# Patient Record
Sex: MALE | Race: WHITE | NOT HISPANIC OR LATINO | Employment: FULL TIME | URBAN - METROPOLITAN AREA
[De-identification: names, ages, dates, MRNs, and addresses within clinical notes are randomized per-mention and may not be internally consistent; named-entity substitution may affect disease eponyms.]

---

## 2017-03-25 ENCOUNTER — HOSPITAL ENCOUNTER (EMERGENCY)
Facility: HOSPITAL | Age: 27
Discharge: HOME | End: 2017-03-25
Attending: EMERGENCY MEDICINE

## 2017-03-25 VITALS
SYSTOLIC BLOOD PRESSURE: 152 MMHG | WEIGHT: 220 LBS | BODY MASS INDEX: 31.5 KG/M2 | RESPIRATION RATE: 20 BRPM | DIASTOLIC BLOOD PRESSURE: 90 MMHG | TEMPERATURE: 98 F | HEIGHT: 70 IN | HEART RATE: 104 BPM | OXYGEN SATURATION: 95 %

## 2017-03-25 DIAGNOSIS — J45.21 MILD INTERMITTENT ASTHMA WITH ACUTE EXACERBATION: Primary | ICD-10-CM

## 2017-03-25 DIAGNOSIS — R06.02 SOB (SHORTNESS OF BREATH): ICD-10-CM

## 2017-03-25 LAB
ALBUMIN SERPL BCP-MCNC: 4.2 G/DL
ALLENS TEST: ABNORMAL
ALP SERPL-CCNC: 43 U/L
ALT SERPL W/O P-5'-P-CCNC: 24 U/L
ANION GAP SERPL CALC-SCNC: 14 MMOL/L
AST SERPL-CCNC: 25 U/L
BACTERIA #/AREA URNS HPF: NORMAL /HPF
BASOPHILS # BLD AUTO: 0.02 K/UL
BASOPHILS NFR BLD: 0.2 %
BILIRUB SERPL-MCNC: 2.3 MG/DL
BILIRUB UR QL STRIP: ABNORMAL
BUN SERPL-MCNC: 12 MG/DL
CALCIUM SERPL-MCNC: 9.5 MG/DL
CHLORIDE SERPL-SCNC: 102 MMOL/L
CLARITY UR: CLEAR
CO2 SERPL-SCNC: 21 MMOL/L
COLOR UR: YELLOW
CREAT SERPL-MCNC: 0.9 MG/DL
DELSYS: ABNORMAL
DIFFERENTIAL METHOD: ABNORMAL
EOSINOPHIL # BLD AUTO: 0.3 K/UL
EOSINOPHIL NFR BLD: 2.8 %
ERYTHROCYTE [DISTWIDTH] IN BLOOD BY AUTOMATED COUNT: 13.4 %
EST. GFR  (AFRICAN AMERICAN): >60 ML/MIN/1.73 M^2
EST. GFR  (NON AFRICAN AMERICAN): >60 ML/MIN/1.73 M^2
FIO2: 21
GLUCOSE SERPL-MCNC: 132 MG/DL
GLUCOSE UR QL STRIP: NEGATIVE
HCO3 UR-SCNC: 21.4 MMOL/L (ref 24–28)
HCT VFR BLD AUTO: 48.7 %
HGB BLD-MCNC: 17.8 G/DL
HGB UR QL STRIP: ABNORMAL
HYALINE CASTS #/AREA URNS LPF: 0 /LPF
KETONES UR QL STRIP: ABNORMAL
LEUKOCYTE ESTERASE UR QL STRIP: NEGATIVE
LYMPHOCYTES # BLD AUTO: 1.7 K/UL
LYMPHOCYTES NFR BLD: 16.9 %
MCH RBC QN AUTO: 30.3 PG
MCHC RBC AUTO-ENTMCNC: 36.6 %
MCV RBC AUTO: 83 FL
MICROSCOPIC COMMENT: NORMAL
MODE: ABNORMAL
MONOCYTES # BLD AUTO: 1.9 K/UL
MONOCYTES NFR BLD: 18.6 %
NEUTROPHILS # BLD AUTO: 6.1 K/UL
NEUTROPHILS NFR BLD: 61.9 %
NITRITE UR QL STRIP: NEGATIVE
PCO2 BLDA: 23.6 MMHG (ref 35–45)
PH SMN: 7.57 [PH] (ref 7.35–7.45)
PH UR STRIP: 6 [PH] (ref 5–8)
PLATELET # BLD AUTO: 191 K/UL
PLATELET BLD QL SMEAR: ABNORMAL
PMV BLD AUTO: 10.9 FL
PO2 BLDA: 63 MMHG (ref 80–100)
POC BE: -1 MMOL/L
POC SATURATED O2: 95 % (ref 95–100)
POTASSIUM SERPL-SCNC: 3.2 MMOL/L
PROT SERPL-MCNC: 8 G/DL
PROT UR QL STRIP: ABNORMAL
RBC # BLD AUTO: 5.88 M/UL
RBC #/AREA URNS HPF: 1 /HPF (ref 0–4)
SAMPLE: ABNORMAL
SITE: ABNORMAL
SODIUM SERPL-SCNC: 137 MMOL/L
SP GR UR STRIP: >=1.03 (ref 1–1.03)
SQUAMOUS #/AREA URNS HPF: 0 /HPF
STOMATOCYTES BLD QL SMEAR: PRESENT
URN SPEC COLLECT METH UR: ABNORMAL
UROBILINOGEN UR STRIP-ACNC: 1 EU/DL
WBC # BLD AUTO: 9.94 K/UL
WBC #/AREA URNS HPF: 0 /HPF (ref 0–5)

## 2017-03-25 PROCEDURE — 85025 COMPLETE CBC W/AUTO DIFF WBC: CPT

## 2017-03-25 PROCEDURE — 63600175 PHARM REV CODE 636 W HCPCS: Performed by: EMERGENCY MEDICINE

## 2017-03-25 PROCEDURE — 81000 URINALYSIS NONAUTO W/SCOPE: CPT

## 2017-03-25 PROCEDURE — 93005 ELECTROCARDIOGRAM TRACING: CPT

## 2017-03-25 PROCEDURE — 93010 ELECTROCARDIOGRAM REPORT: CPT | Mod: 76,,, | Performed by: INTERNAL MEDICINE

## 2017-03-25 PROCEDURE — 94640 AIRWAY INHALATION TREATMENT: CPT

## 2017-03-25 PROCEDURE — 27000221 HC OXYGEN, UP TO 24 HOURS

## 2017-03-25 PROCEDURE — 99900035 HC TECH TIME PER 15 MIN (STAT)

## 2017-03-25 PROCEDURE — 99285 EMERGENCY DEPT VISIT HI MDM: CPT | Mod: 25

## 2017-03-25 PROCEDURE — 80053 COMPREHEN METABOLIC PANEL: CPT

## 2017-03-25 PROCEDURE — 96374 THER/PROPH/DIAG INJ IV PUSH: CPT

## 2017-03-25 PROCEDURE — 82803 BLOOD GASES ANY COMBINATION: CPT

## 2017-03-25 PROCEDURE — 25000242 PHARM REV CODE 250 ALT 637 W/ HCPCS: Performed by: EMERGENCY MEDICINE

## 2017-03-25 PROCEDURE — 36600 WITHDRAWAL OF ARTERIAL BLOOD: CPT

## 2017-03-25 RX ORDER — PREDNISONE 50 MG/1
50 TABLET ORAL DAILY
Qty: 5 TABLET | Refills: 0 | Status: SHIPPED | OUTPATIENT
Start: 2017-03-25 | End: 2017-03-30

## 2017-03-25 RX ORDER — FLUTICASONE PROPIONATE AND SALMETEROL 100; 50 UG/1; UG/1
1 POWDER RESPIRATORY (INHALATION) 2 TIMES DAILY
COMMUNITY

## 2017-03-25 RX ORDER — LORAZEPAM 2 MG/ML
1 INJECTION INTRAMUSCULAR
Status: COMPLETED | OUTPATIENT
Start: 2017-03-25 | End: 2017-03-25

## 2017-03-25 RX ORDER — ALBUTEROL SULFATE 0.83 MG/ML
2.5 SOLUTION RESPIRATORY (INHALATION) EVERY 6 HOURS PRN
Qty: 30 EACH | Refills: 0 | Status: SHIPPED | OUTPATIENT
Start: 2017-03-25 | End: 2018-03-25

## 2017-03-25 RX ORDER — IPRATROPIUM BROMIDE AND ALBUTEROL SULFATE 2.5; .5 MG/3ML; MG/3ML
3 SOLUTION RESPIRATORY (INHALATION)
Status: COMPLETED | OUTPATIENT
Start: 2017-03-25 | End: 2017-03-25

## 2017-03-25 RX ADMIN — IPRATROPIUM BROMIDE AND ALBUTEROL SULFATE 3 ML: .5; 3 SOLUTION RESPIRATORY (INHALATION) at 09:03

## 2017-03-25 RX ADMIN — LORAZEPAM 1 MG: 2 INJECTION, SOLUTION INTRAMUSCULAR; INTRAVENOUS at 10:03

## 2017-03-25 NOTE — ED NOTES
Patient sitting up in bed, no acute distress noted, awake, alert, and oriented x 3, calm, respirations even, and skin is warm and dry. Patient verbalized understanding of status and plan of care. Patient denies needs at this time. Side rails up x 2, call light in reach, bed low and locked. Will continue to monitor.

## 2017-03-25 NOTE — ED PROVIDER NOTES
SCRIBE #1 NOTE: I, Nabil Wyman, am scribing for, and in the presence of, Carrillo Corrigan MD. I have scribed the HPI, ROS, and PE.     SCRIBE #2 NOTE: I, Presley Hollis, am scribing for, and in the presence of,  Carrillo Corrigan MD. I have scribed the remaining portions of the note not scribed by Scribe #1.     History      Chief Complaint   Patient presents with    Asthma     asthma flare up, c/o SOB with wheezing       Review of patient's allergies indicates:   Allergen Reactions    Adhesive         HPI   HPI    3/25/2017, 9:42 AM   History obtained from the patient      History of Present Illness: Otf Salinas is a 27 y.o. male patient who presents to the Emergency Department for SOB which onset gradually 2 days ago worsening since. Symptoms are constant and moderate in severity. Sx are exacerbated by nothing and relieved by nothing. Pt describes his sxs as an asthma attack. Associated sxs include wheezing and congestion. No other sxs reported. Pt received solumedrol en route to ED via [facility]. Patient denies any fever, N/V/D, chills, abd pain, palpitations, chest tightness, CP, cough, leg swelling and all other sxs at this time. Pt reports having to be admitted in he past for an asthma exacerbation. No further complaints or concerns at this time.     Arrival mode: [facility]    PCP: Christel Pavon MD       Past Medical History:  Past Medical History:   Diagnosis Date    Asthma        Past Surgical History:  Past Surgical History:   Procedure Laterality Date    KNEE ARTHROSCOPY      TONSILLECTOMY      TONSILLECTOMY, ADENOIDECTOMY           Family History:  History reviewed. No pertinent family history.    Social History:  Social History     Social History Main Topics    Smoking status: Current Some Day Smoker     Types: Cigarettes    Smokeless tobacco: Not on file    Alcohol use Yes      Comment: occassionally    Drug use: Yes     Special: Marijuana      Comment: rarely    Sexual activity: Not on  file       ROS   Review of Systems   Constitutional: Negative for chills and fever.   HENT: Positive for congestion. Negative for sore throat.    Respiratory: Positive for shortness of breath and wheezing. Negative for chest tightness.    Cardiovascular: Negative for chest pain.   Gastrointestinal: Negative for abdominal pain, nausea and vomiting.   Musculoskeletal: Negative for back pain and neck pain.   Skin: Negative for rash.   Neurological: Negative for dizziness, numbness and headaches.   Psychiatric/Behavioral: Negative for agitation and confusion.   All other systems reviewed and are negative.      Physical Exam    Initial Vitals   BP Pulse Resp Temp SpO2   03/25/17 0940 03/25/17 0940 03/25/17 0940 03/25/17 0940 03/25/17 0940   149/96 110 24 98.4 °F (36.9 °C) 99 %      Physical Exam  Nursing Notes and Vital Signs Reviewed.  Constitutional: Patient is in mild distress. Awake and alert. Well-developed and well-nourished.  Head: Atraumatic. Normocephalic.  Eyes: PERRL. EOM intact. Conjunctivae are not pale. No scleral icterus.  ENT: Mucous membranes are moist. Oropharynx is clear and symmetric.    Neck: Supple. Full ROM. No lymphadenopathy.  Cardiovascular: Tachycardic, normal heart sounds. No murmurs, rubs, or gallops. Distal pulses are 2+ and symmetric.  Pulmonary/Chest: Mild respiratory distress. Diffuse wheezing noted bilaterally. No wheezing, rales, or rhonchi.  Abdominal: Soft and non-distended.  There is no tenderness.  No rebound, guarding, or rigidity. Good bowel sounds.  Musculoskeletal: Moves all extremities. No obvious deformities. No edema. No calf tenderness.  Skin: Warm and dry.  Neurological:  Alert, awake, and appropriate.  Normal speech.  No acute focal neurological deficits are appreciated.  Psychiatric: Normal affect. Good eye contact. Appropriate in content.    ED Course    Procedures  ED Vital Signs:  Vitals:    03/25/17 0940 03/25/17 0946 03/25/17 0950 03/25/17 0952   BP: (!) 149/96     "  Pulse: 110 (!) 112 106 (!) 112   Resp: (!) 24 (!) 22 (!) 22    Temp: 98.4 °F (36.9 °C)      TempSrc: Oral      SpO2: 99% 97% 98%    Weight: 99.8 kg (220 lb)      Height: 5' 10" (1.778 m)       03/25/17 0955 03/25/17 1036 03/25/17 1048 03/25/17 1052   BP:       Pulse: 105 (S) (!) 127 (!) 118 (!) 119   Resp: 18 (!) 30 (!) 26 (!) 21   Temp:       TempSrc:       SpO2: 98% 95% (!) 91% 97%   Weight:       Height:        03/25/17 1131   BP: (!) 142/84   Pulse: (!) 115   Resp: (!) 22   Temp:    TempSrc:    SpO2: (!) 94%   Weight:    Height:        Abnormal Lab Results:  Labs Reviewed   CBC W/ AUTO DIFFERENTIAL - Abnormal; Notable for the following:        Result Value    MCHC 36.6 (*)     Mono # 1.9 (*)     Lymph% 16.9 (*)     Mono% 18.6 (*)     All other components within normal limits   COMPREHENSIVE METABOLIC PANEL - Abnormal; Notable for the following:     Potassium 3.2 (*)     CO2 21 (*)     Glucose 132 (*)     Total Bilirubin 2.3 (*)     Alkaline Phosphatase 43 (*)     All other components within normal limits   URINALYSIS - Abnormal; Notable for the following:     Specific Gravity, UA >=1.030 (*)     Protein, UA 1+ (*)     Ketones, UA 1+ (*)     Bilirubin (UA) 1+ (*)     Occult Blood UA 2+ (*)     All other components within normal limits   ISTAT PROCEDURE - Abnormal; Notable for the following:     POC PH 7.566 (*)     POC PCO2 23.6 (*)     POC PO2 63 (*)     POC HCO3 21.4 (*)     All other components within normal limits   URINALYSIS MICROSCOPIC        All Lab Results:  Results for orders placed or performed during the hospital encounter of 03/25/17   CBC auto differential   Result Value Ref Range    WBC 9.94 3.90 - 12.70 K/uL    RBC 5.88 4.60 - 6.20 M/uL    Hemoglobin 17.8 14.0 - 18.0 g/dL    Hematocrit 48.7 40.0 - 54.0 %    MCV 83 82 - 98 fL    MCH 30.3 27.0 - 31.0 pg    MCHC 36.6 (H) 32.0 - 36.0 %    RDW 13.4 11.5 - 14.5 %    Platelets 191 150 - 350 K/uL    MPV 10.9 9.2 - 12.9 fL    Gran # 6.1 1.8 - 7.7 K/uL    " Lymph # 1.7 1.0 - 4.8 K/uL    Mono # 1.9 (H) 0.3 - 1.0 K/uL    Eos # 0.3 0.0 - 0.5 K/uL    Baso # 0.02 0.00 - 0.20 K/uL    Gran% 61.9 38.0 - 73.0 %    Lymph% 16.9 (L) 18.0 - 48.0 %    Mono% 18.6 (H) 4.0 - 15.0 %    Eosinophil% 2.8 0.0 - 8.0 %    Basophil% 0.2 0.0 - 1.9 %    Platelet Estimate Appears normal     Stomatocytes Present     Differential Method Automated    Comprehensive metabolic panel   Result Value Ref Range    Sodium 137 136 - 145 mmol/L    Potassium 3.2 (L) 3.5 - 5.1 mmol/L    Chloride 102 95 - 110 mmol/L    CO2 21 (L) 23 - 29 mmol/L    Glucose 132 (H) 70 - 110 mg/dL    BUN, Bld 12 6 - 20 mg/dL    Creatinine 0.9 0.5 - 1.4 mg/dL    Calcium 9.5 8.7 - 10.5 mg/dL    Total Protein 8.0 6.0 - 8.4 g/dL    Albumin 4.2 3.5 - 5.2 g/dL    Total Bilirubin 2.3 (H) 0.1 - 1.0 mg/dL    Alkaline Phosphatase 43 (L) 55 - 135 U/L    AST 25 10 - 40 U/L    ALT 24 10 - 44 U/L    Anion Gap 14 8 - 16 mmol/L    eGFR if African American >60 >60 mL/min/1.73 m^2    eGFR if non African American >60 >60 mL/min/1.73 m^2   Urinalysis   Result Value Ref Range    Specimen UA Urine, Clean Catch     Color, UA Yellow Yellow, Straw, Opal    Appearance, UA Clear Clear    pH, UA 6.0 5.0 - 8.0    Specific Gravity, UA >=1.030 (A) 1.005 - 1.030    Protein, UA 1+ (A) Negative    Glucose, UA Negative Negative    Ketones, UA 1+ (A) Negative    Bilirubin (UA) 1+ (A) Negative    Occult Blood UA 2+ (A) Negative    Nitrite, UA Negative Negative    Urobilinogen, UA 1.0 <2.0 EU/dL    Leukocytes, UA Negative Negative   Urinalysis Microscopic   Result Value Ref Range    RBC, UA 1 0 - 4 /hpf    WBC, UA 0 0 - 5 /hpf    Bacteria, UA Rare None-Occ /hpf    Squam Epithel, UA 0 /hpf    Hyaline Casts, UA 0 0-1/lpf /lpf    Microscopic Comment SEE COMMENT    ISTAT PROCEDURE   Result Value Ref Range    POC PH 7.566 (HH) 7.35 - 7.45    POC PCO2 23.6 (LL) 35 - 45 mmHg    POC PO2 63 (L) 80 - 100 mmHg    POC HCO3 21.4 (L) 24 - 28 mmol/L    POC BE -1 -2 to 2 mmol/L     POC SATURATED O2 95 95 - 100 %    Sample ARTERIAL     Site LR     Allens Test Pass     DelSys Room Air     Mode SPONT     FiO2 21          Imaging Results:  Imaging Results         X-Ray Chest AP Portable (Final result) Result time:  03/25/17 10:12:42    Final result by Kimberli Monaco MD (03/25/17 10:12:42)    Impression:         Negative portable chest x-ray.      Electronically signed by: KIMBERLI MONACO MD  Date:     03/25/17  Time:    10:12     Narrative:    Portable chest x-ray. 03/25/17 09:52:58    Clinical indication:  sob    Comparison is made with the previous study of 12/31/2015.  Heart size is normal. The lung fields are clear. No acute cardiopulmonary infiltrate.               The EKG was ordered, reviewed, and independently interpreted by the ED provider.  Interpretation time: 0947  Rate: 102 BPM  Rhythm: sinus tachycardia  Interpretation: T wave abnormality. No STEMI.         The EKG was ordered, reviewed, and independently interpreted by the ED provider.  Interpretation time: 1015  Rate: 123 BPM  Rhythm: sinus tachycardia  Interpretation: Right atrial enlargement. Rightward axis. Pulmonary disease pattern. No STEMI.    The Emergency Provider reviewed the vital signs and test results, which are outlined above.    ED Discussion      11:28 AM: Reassessed pt at this time. Pt is awake, alert, and in NAD. Pt states his condition has improved at this time. Discussed with pt all pertinent ED information and results. Discussed pt dx of mild intermittent asthma with acute exacerbation and plan of tx. Gave pt all f/u and return to the ED instructions. All questions and concerns were addressed at this time. Pt expresses understanding of information and instructions, and is comfortable with plan to discharge. Pt is stable for discharge.    I discussed with patient and/or family/caretaker that evaluation in the ED does not suggest any emergent or life threatening medical conditions requiring immediate  intervention beyond what was provided in the ED, and I believe patient is safe for discharge.  Regardless, an unremarkable evaluation in the ED does not preclude the development or presence of a serious of life threatening condition. As such, patient was instructed to return immediately for any worsening or change in current symptoms.    Pre-hypertension/Hypertension: The pt has been informed that they may have pre-hypertension or hypertension based on a blood pressure reading in the ED. I recommend that the pt call the PCP listed on their discharge instructions or a physician of their choice this week to arrange f/u for further evaluation of possible pre-hypertension or hypertension.       ED Medication(s):  Medications   albuterol-ipratropium 2.5mg-0.5mg/3mL nebulizer solution 3 mL (3 mLs Nebulization Given 3/25/17 9638)   lorazepam injection 1 mg (1 mg Intravenous Given 3/25/17 1021)       New Prescriptions    ALBUTEROL (PROVENTIL) 2.5 MG /3 ML (0.083 %) NEBULIZER SOLUTION    Take 3 mLs (2.5 mg total) by nebulization every 6 (six) hours as needed for Wheezing.    PREDNISONE (DELTASONE) 50 MG TAB    Take 1 tablet (50 mg total) by mouth once daily.       Follow-up Information     Follow up with Christel Pavon MD In 2 days.    Specialty:  Family Medicine    Contact information:    [address] [place] [address]  [address]  [facility] [address] [phone]              Medical Decision Making    Medical Decision Making:   Clinical Tests:   Lab Tests: Reviewed and Ordered  Radiological Study: Ordered and Reviewed  Medical Tests: Reviewed and Ordered           Scribe Attestation:   Scribe #1: I performed the above scribed service and the documentation accurately describes the services I performed. I attest to the accuracy of the note.    Attending:   Physician Attestation Statement for Scribe #1: I, Carrillo Corrigan MD, personally performed the services described in this documentation, as scribed by Nabil  Zamzam, in my presence, and it is both accurate and complete.       Scribe Attestation:   Scribe #2: I performed the above scribed service and the documentation accurately describes the services I performed. I attest to the accuracy of the note.    Attending Attestation:           Physician Attestation for Scribe:    Physician Attestation Statement for Scribe #2: I, Carrillo Corrigan MD, reviewed documentation, as scribed by Presley Hollis in my presence, and it is both accurate and complete. I also acknowledge and confirm the content of the note done by Scribe #1.          Clinical Impression       ICD-10-CM ICD-9-CM   1. Mild intermittent asthma with acute exacerbation J45.21 493.92   2. SOB (shortness of breath) R06.02 786.05       Disposition:   Disposition: Discharged  Condition: Stable         Carrillo Corrigan MD  03/25/17 9145

## 2017-03-25 NOTE — PROGRESS NOTES
Pt was brought by EMS to bed 14 in ER. He had breathing treatment still in progress from EMS. Pt appeared to be short of breath with respirations at about 24. BBS inspiratory & expiratory wheezes. Duoneb x3 breathing treatments given. Aeration increased, but wheezing still heard throughout. Patient's heart rate post breathing treatments were in the 120s. Will hold more breathing treatments for now due to increased heart rate per Dr. Corrigan. ABGs were obtained on room air as well. Results shown to Dr. Corrigan.   Patient was placed on 2L NC.

## 2017-03-25 NOTE — ED NOTES
Pt states he is terrified of needles and would like something to relax him before ABG stick. Dr. Corrigan notified.

## 2017-03-25 NOTE — ED NOTES
Pt lying in bed. No acute distress. States he feels much better. No needs at this time. Discharge instructions were given and understood by pt.

## 2017-03-25 NOTE — ED AVS SNAPSHOT
[facility]  [address]  [place] [address]               Otf Salinas   3/25/2017  9:39 AM   ED    Description:  Male : 1990   Department:  [facility]           Your Care was Coordinated By:     Provider Role From To    Carrillo Corrigan MD Attending Provider 17 0939 --      Reason for Visit     Asthma           Diagnoses this Visit        Comments    Mild intermittent asthma with acute exacerbation    -  Primary     SOB (shortness of breath)           ED Disposition     ED Disposition Condition Comment    Discharge             To Do List           Follow-up Information     Follow up with Christel Pavon MD In 2 days.    Specialty:  Family Medicine    Contact information:    [address] [place] [address]  [address]  [facility]  [place] [address]  [phone]         These Medications        Disp Refills Start End    predniSONE (DELTASONE) 50 MG Tab 5 tablet 0 3/25/2017 3/30/2017    Take 1 tablet (50 mg total) by mouth once daily. - Oral    albuterol (PROVENTIL) 2.5 mg /3 mL (0.083 %) nebulizer solution 30 each 0 3/25/2017 3/25/2018    Take 3 mLs (2.5 mg total) by nebulization every 6 (six) hours as needed for Wheezing. - Nebulization      [facility]     [facility] -  Assistance  Registered nurses in the [facility] provide clinical advisement, health education, appointment booking, and other advisory services.  Call for this free service at [phone].             Medications           Message regarding Medications     Verify the changes and/or additions to your medication regime listed below are the same as discussed with your clinician today.  If any of these changes or additions are incorrect, please notify your healthcare provider.        START taking these NEW medications        Refills    predniSONE (DELTASONE) 50 MG Tab 0    Sig: Take 1 tablet (50 mg total) by mouth  "once daily.    Class: Print    Route: Oral    albuterol (PROVENTIL) 2.5 mg /3 mL (0.083 %) nebulizer solution 0    Sig: Take 3 mLs (2.5 mg total) by nebulization every 6 (six) hours as needed for Wheezing.    Class: Print    Route: Nebulization      These medications were administered today        Dose Freq    albuterol-ipratropium 2.5mg-0.5mg/3mL nebulizer solution 3 mL 3 mL Every 5 min    Sig: Take 3 mLs by nebulization every 5 (five) minutes.    Class: Normal    Route: Nebulization    lorazepam injection 1 mg 1 mg ED 1 Time    Sig: Inject 0.5 mLs (1 mg total) into the vein ED 1 Time.    Class: Normal    Route: Intravenous      STOP taking these medications     diclofenac (VOLTAREN) 50 MG EC tablet Take 50 mg by mouth 2 (two) times daily as needed.    albuterol 90 mcg/actuation inhaler Inhale 1-2 puffs into the lungs every 6 (six) hours as needed for Wheezing.           Verify that the below list of medications is an accurate representation of the medications you are currently taking.  If none reported, the list may be blank. If incorrect, please contact your healthcare provider. Carry this list with you in case of emergency.           Current Medications     fluticasone-salmeterol 100-50 mcg/dose (ADVAIR) 100-50 mcg/dose diskus inhaler Inhale 1 puff into the lungs 2 (two) times daily. Controller    albuterol (PROVENTIL) 2.5 mg /3 mL (0.083 %) nebulizer solution Take 3 mLs (2.5 mg total) by nebulization every 6 (six) hours as needed for Wheezing.    predniSONE (DELTASONE) 50 MG Tab Take 1 tablet (50 mg total) by mouth once daily.           Clinical Reference Information           Your Vitals Were     BP Pulse Temp Resp Height Weight    149/96 (BP Location: Right arm, Patient Position: Sitting) 119 98.4 °F (36.9 °C) (Oral) 21 5' 10" (1.778 m) 99.8 kg (220 lb)    SpO2 BMI             97% 31.57 kg/m2         Allergies as of 3/25/2017        Reactions    Adhesive       Immunizations Administered on Date of Encounter - " 3/25/2017     None      ED Micro, Lab, POCT     Start Ordered       Status Ordering Provider    03/25/17 1038 03/25/17 1038  ISTAT PROCEDURE  Once      Final result     03/25/17 0943 03/25/17 0942  CBC auto differential  STAT      Preliminary result     03/25/17 0943 03/25/17 0942  Comprehensive metabolic panel  STAT      Final result     03/25/17 0943 03/25/17 0942  Urinalysis  STAT      In process       ED Imaging Orders     Start Ordered       Status Ordering Provider    03/25/17 0943 03/25/17 0942  X-Ray Chest AP Portable  1 time imaging      Final result       Discharge References/Attachments     ASTHMA, ACUTE (ADULT) (ENGLISH)      [facility] Sign-Up     Activating your [facility] account is as easy as 1-2-3!     1) Visit [email or web address], select Sign Up Now, enter this activation code and your date of birth, then select Next.  [email or web address]  Expires: 5/9/2017 11:28 AM      2) Create a username and password to use when you visit [facility] in the future and select a security question in case you lose your password and select Next.    3) Enter your e-mail address and click Sign Up!    Additional Information  If you have questions, please e-mail [email or web address] or call [phone] to talk to our [facility] staff. Remember, [facility] is NOT to be used for urgent needs. For medical emergencies, dial 911.         Smoking Cessation     If you would like to quit smoking:   You may be eligible for free services if you are a [place] resident and started smoking cigarettes before September 1, 1988.  Call the Smoking Cessation Trust (SCT) toll free at (631) 690-0000 or (540) 985-1648.   Call 1-497-QUIT-NOW if you do not meet the above criteria.             [facility] complies with applicable Federal civil rights laws and does not discriminate on the basis of race, color, national origin, age, disability, or sex.        Language Assistance Services     ATTENTION: Language assistance services are  available, free of charge. Please call [phone].      ATENCIÓN: Si habla español, tiene a mcintosh disposición servicios gratuitos de asistencia lingüística. Llame al [phone].     CHÚ Ý: N?u b?n nói Ti?ng Vi?t, có các d?ch v? h? tr? ngôn ng? mi?n phí dành cho b?n. G?i s? [phone].